# Patient Record
Sex: MALE | Race: BLACK OR AFRICAN AMERICAN | Employment: FULL TIME | URBAN - METROPOLITAN AREA
[De-identification: names, ages, dates, MRNs, and addresses within clinical notes are randomized per-mention and may not be internally consistent; named-entity substitution may affect disease eponyms.]

---

## 2017-12-11 ENCOUNTER — HOSPITAL ENCOUNTER (EMERGENCY)
Facility: HOSPITAL | Age: 22
Discharge: HOME | End: 2017-12-11
Attending: EMERGENCY MEDICINE

## 2017-12-11 VITALS
DIASTOLIC BLOOD PRESSURE: 80 MMHG | OXYGEN SATURATION: 99 % | SYSTOLIC BLOOD PRESSURE: 122 MMHG | TEMPERATURE: 98 F | WEIGHT: 170 LBS | HEIGHT: 73 IN | HEART RATE: 78 BPM | BODY MASS INDEX: 22.53 KG/M2 | RESPIRATION RATE: 18 BRPM

## 2017-12-11 DIAGNOSIS — K52.9 GASTROENTERITIS: Primary | ICD-10-CM

## 2017-12-11 PROCEDURE — 81003 URINALYSIS AUTO W/O SCOPE: CPT | Performed by: EMERGENCY MEDICINE

## 2017-12-11 PROCEDURE — 80048 BASIC METABOLIC PNL TOTAL CA: CPT | Performed by: EMERGENCY MEDICINE

## 2017-12-11 PROCEDURE — 99284 EMERGENCY DEPT VISIT MOD MDM: CPT

## 2017-12-11 PROCEDURE — 85025 COMPLETE CBC W/AUTO DIFF WBC: CPT | Performed by: EMERGENCY MEDICINE

## 2017-12-11 PROCEDURE — 96361 HYDRATE IV INFUSION ADD-ON: CPT

## 2017-12-11 PROCEDURE — 96374 THER/PROPH/DIAG INJ IV PUSH: CPT

## 2017-12-11 PROCEDURE — 81003 URINALYSIS AUTO W/O SCOPE: CPT

## 2017-12-11 RX ORDER — ONDANSETRON 4 MG/1
4 TABLET, ORALLY DISINTEGRATING ORAL EVERY 4 HOURS PRN
Qty: 10 TABLET | Refills: 0 | Status: SHIPPED | OUTPATIENT
Start: 2017-12-11 | End: 2017-12-18

## 2017-12-11 RX ORDER — ONDANSETRON 2 MG/ML
4 INJECTION INTRAMUSCULAR; INTRAVENOUS ONCE
Status: COMPLETED | OUTPATIENT
Start: 2017-12-11 | End: 2017-12-11

## 2017-12-11 NOTE — ED INITIAL ASSESSMENT (HPI)
Pt aox4. Pt presents to ed from home. Pt c/o weakness/fatigue started yesterday. Pt states vomited x 2 this am. Pt denies abd pain/fever.

## 2017-12-11 NOTE — ED PROVIDER NOTES
Patient Seen in: [facility] Emergency Department    History   Patient presents with:  Nausea/Vomiting/Diarrhea (gastrointestinal)    Stated Complaint: n/v/d    HPI    This is a 20-year-old male complaining of vomiting the patient states yesterday ju limits no focal deficits.     ED Course     Labs Reviewed   URINALYSIS WITH CULTURE REFLEX - Abnormal; Notable for the following:        Result Value    Ketones Urine Trace (*)     All other components within normal limits   BASIC METABOLIC PANEL (8) - Norm

## 2018-05-19 ENCOUNTER — HOSPITAL ENCOUNTER (EMERGENCY)
Facility: HOSPITAL | Age: 23
Discharge: HOME | End: 2018-05-19
Attending: EMERGENCY MEDICINE
Payer: COMMERCIAL

## 2018-05-19 VITALS
BODY MASS INDEX: 22.53 KG/M2 | OXYGEN SATURATION: 100 % | RESPIRATION RATE: 18 BRPM | WEIGHT: 170 LBS | HEART RATE: 72 BPM | TEMPERATURE: 98 F | DIASTOLIC BLOOD PRESSURE: 78 MMHG | SYSTOLIC BLOOD PRESSURE: 127 MMHG | HEIGHT: 73 IN

## 2018-05-19 DIAGNOSIS — S02.602A CLOSED FRACTURE OF LEFT SIDE OF MANDIBULAR BODY, INITIAL ENCOUNTER (HCC): Primary | ICD-10-CM

## 2018-05-19 PROCEDURE — 99283 EMERGENCY DEPT VISIT LOW MDM: CPT

## 2018-05-19 RX ORDER — TRAMADOL HYDROCHLORIDE 50 MG/1
TABLET ORAL EVERY 4 HOURS PRN
Qty: 10 TABLET | Refills: 0 | Status: ON HOLD | OUTPATIENT
Start: 2018-05-19 | End: 2018-05-23

## 2018-05-19 RX ORDER — IBUPROFEN 200 MG
200 TABLET ORAL EVERY 6 HOURS PRN
Status: ON HOLD | COMMUNITY
End: 2018-05-23
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
## 2018-05-19 NOTE — ED PROVIDER NOTES
Patient Seen in: [facility] Emergency Department    History   Patient presents with: Other    Stated Complaint: know jaw fracture sent to ED from dentist and orthodontist    HPI    47-year-old male presents for evaluation of a mandible fracture.   P Dentition intact. Mild swelling and tenderness along the left mandible just anterior to the angle   neck: Supple, no C-spine tenderness  Lungs: Clear to auscultation bilaterally. Heart: Regular rate and rhythm. Abdomen: Soft, nontender.    Skin: No rash

## 2018-05-23 ENCOUNTER — ANESTHESIA EVENT (OUTPATIENT)
Dept: SURGERY | Facility: HOSPITAL | Age: 23
End: 2018-05-23

## 2018-05-23 ENCOUNTER — ANESTHESIA (OUTPATIENT)
Dept: SURGERY | Facility: HOSPITAL | Age: 23
End: 2018-05-23

## 2018-05-23 ENCOUNTER — HOSPITAL ENCOUNTER (INPATIENT)
Facility: HOSPITAL | Age: 23
LOS: 1 days | Discharge: HOME | DRG: 132 | End: 2018-05-24
Attending: DENTIST | Admitting: DENTIST
Payer: COMMERCIAL

## 2018-05-23 ENCOUNTER — SURGERY (OUTPATIENT)
Age: 23
End: 2018-05-23

## 2018-05-23 ENCOUNTER — APPOINTMENT (OUTPATIENT)
Dept: GENERAL RADIOLOGY | Facility: HOSPITAL | Age: 23
DRG: 132 | End: 2018-05-23
Attending: DENTIST
Payer: COMMERCIAL

## 2018-05-23 PROBLEM — S02.600B: Status: ACTIVE | Noted: 2018-05-23

## 2018-05-23 PROCEDURE — S0077 INJECTION, CLINDAMYCIN PHOSP: HCPCS | Performed by: DENTIST

## 2018-05-23 PROCEDURE — 70110 X-RAY EXAM OF JAW 4/> VIEWS: CPT | Performed by: DENTIST

## 2018-05-23 PROCEDURE — 0NSV04Z REPOSITION LEFT MANDIBLE WITH INTERNAL FIXATION DEVICE, OPEN APPROACH: ICD-10-PCS | Performed by: DENTIST

## 2018-05-23 RX ORDER — HYDROMORPHONE HYDROCHLORIDE 1 MG/ML
0.4 INJECTION, SOLUTION INTRAMUSCULAR; INTRAVENOUS; SUBCUTANEOUS EVERY 5 MIN PRN
Status: DISCONTINUED | OUTPATIENT
Start: 2018-05-23 | End: 2018-05-24 | Stop reason: HOSPADM

## 2018-05-23 RX ORDER — LIDOCAINE HYDROCHLORIDE AND EPINEPHRINE 10; 10 MG/ML; UG/ML
INJECTION, SOLUTION INFILTRATION; PERINEURAL AS NEEDED
Status: DISCONTINUED | OUTPATIENT
Start: 2018-05-23 | End: 2018-05-23 | Stop reason: HOSPADM

## 2018-05-23 RX ORDER — ONDANSETRON 2 MG/ML
4 INJECTION INTRAMUSCULAR; INTRAVENOUS AS NEEDED
Status: DISCONTINUED | OUTPATIENT
Start: 2018-05-23 | End: 2018-05-24 | Stop reason: HOSPADM

## 2018-05-23 RX ORDER — SODIUM CHLORIDE, SODIUM LACTATE, POTASSIUM CHLORIDE, CALCIUM CHLORIDE 600; 310; 30; 20 MG/100ML; MG/100ML; MG/100ML; MG/100ML
INJECTION, SOLUTION INTRAVENOUS CONTINUOUS
Status: DISCONTINUED | OUTPATIENT
Start: 2018-05-23 | End: 2018-05-24

## 2018-05-23 RX ORDER — MEPERIDINE HYDROCHLORIDE 50 MG/ML
INJECTION INTRAMUSCULAR; INTRAVENOUS; SUBCUTANEOUS
Status: COMPLETED
Start: 2018-05-23 | End: 2018-05-23

## 2018-05-23 RX ORDER — NALOXONE HYDROCHLORIDE 0.4 MG/ML
80 INJECTION, SOLUTION INTRAMUSCULAR; INTRAVENOUS; SUBCUTANEOUS AS NEEDED
Status: DISCONTINUED | OUTPATIENT
Start: 2018-05-23 | End: 2018-05-24 | Stop reason: HOSPADM

## 2018-05-23 RX ORDER — METOCLOPRAMIDE HYDROCHLORIDE 5 MG/ML
10 INJECTION INTRAMUSCULAR; INTRAVENOUS EVERY 6 HOURS PRN
Status: DISCONTINUED | OUTPATIENT
Start: 2018-05-23 | End: 2018-05-24

## 2018-05-23 RX ORDER — MEPERIDINE HYDROCHLORIDE 25 MG/ML
12.5 INJECTION INTRAMUSCULAR; INTRAVENOUS; SUBCUTANEOUS AS NEEDED
Status: COMPLETED | OUTPATIENT
Start: 2018-05-23 | End: 2018-05-23

## 2018-05-23 RX ORDER — SODIUM CHLORIDE, SODIUM LACTATE, POTASSIUM CHLORIDE, CALCIUM CHLORIDE 600; 310; 30; 20 MG/100ML; MG/100ML; MG/100ML; MG/100ML
INJECTION, SOLUTION INTRAVENOUS CONTINUOUS
Status: DISCONTINUED | OUTPATIENT
Start: 2018-05-23 | End: 2018-05-24 | Stop reason: HOSPADM

## 2018-05-23 RX ORDER — CLINDAMYCIN PHOSPHATE 600 MG/50ML
600 INJECTION INTRAVENOUS ONCE
Status: COMPLETED | OUTPATIENT
Start: 2018-05-23 | End: 2018-05-23

## 2018-05-23 RX ORDER — HYDROCODONE BITARTRATE AND ACETAMINOPHEN 5; 325 MG/1; MG/1
2 TABLET ORAL AS NEEDED
Status: DISCONTINUED | OUTPATIENT
Start: 2018-05-23 | End: 2018-05-24 | Stop reason: HOSPADM

## 2018-05-23 RX ORDER — ONDANSETRON 2 MG/ML
4 INJECTION INTRAMUSCULAR; INTRAVENOUS EVERY 6 HOURS PRN
Status: DISCONTINUED | OUTPATIENT
Start: 2018-05-23 | End: 2018-05-24

## 2018-05-23 RX ORDER — HYDROCODONE BITARTRATE AND ACETAMINOPHEN 5; 325 MG/1; MG/1
1 TABLET ORAL AS NEEDED
Status: DISCONTINUED | OUTPATIENT
Start: 2018-05-23 | End: 2018-05-24 | Stop reason: HOSPADM

## 2018-05-23 RX ORDER — HYDROMORPHONE HYDROCHLORIDE 1 MG/ML
INJECTION, SOLUTION INTRAMUSCULAR; INTRAVENOUS; SUBCUTANEOUS
Status: COMPLETED
Start: 2018-05-23 | End: 2018-05-23

## 2018-05-23 RX ORDER — ACETAMINOPHEN 500 MG
1000 TABLET ORAL ONCE
Status: DISCONTINUED | OUTPATIENT
Start: 2018-05-23 | End: 2018-05-23 | Stop reason: HOSPADM

## 2018-05-23 NOTE — H&P
[address] Patient Status:  Hospital Outpatient Surgery    1995 MRN [id number]   [facility] SURGERY Attending Finesse Caceres 148 Day # 0 PCP Katie Capone MD     History of Present

## 2018-05-24 VITALS
HEART RATE: 45 BPM | DIASTOLIC BLOOD PRESSURE: 80 MMHG | TEMPERATURE: 99 F | SYSTOLIC BLOOD PRESSURE: 131 MMHG | WEIGHT: 158.31 LBS | BODY MASS INDEX: 20.98 KG/M2 | OXYGEN SATURATION: 98 % | RESPIRATION RATE: 18 BRPM | HEIGHT: 73 IN

## 2018-05-24 RX ORDER — DEXTROSE AND SODIUM CHLORIDE 5; .45 G/100ML; G/100ML
INJECTION, SOLUTION INTRAVENOUS CONTINUOUS
Status: DISCONTINUED | OUTPATIENT
Start: 2018-05-24 | End: 2018-05-24

## 2018-05-24 RX ORDER — HYDROMORPHONE HYDROCHLORIDE 1 MG/ML
0.5 INJECTION, SOLUTION INTRAMUSCULAR; INTRAVENOUS; SUBCUTANEOUS EVERY 2 HOUR PRN
Status: DISCONTINUED | OUTPATIENT
Start: 2018-05-24 | End: 2018-05-24

## 2018-05-24 RX ORDER — HYDROMORPHONE HYDROCHLORIDE 1 MG/ML
1 INJECTION, SOLUTION INTRAMUSCULAR; INTRAVENOUS; SUBCUTANEOUS EVERY 2 HOUR PRN
Status: DISCONTINUED | OUTPATIENT
Start: 2018-05-24 | End: 2018-05-24

## 2018-05-24 NOTE — PROGRESS NOTES
Pt resting in bed this am. vss. Pt a&ox3. Pt on ra with 02 sats wnl. Lungs cta. Pt denies difficulty breathing or sob. Jaw wired shut, no drainage noted. Pt using dilaudid for pain with good relief.  Reports he is tolerated water well overnight, denies diff

## 2018-05-24 NOTE — PROGRESS NOTES
NURSING DISCHARGE NOTE    Discharged Home via Wheelchair. Accompanied by Support staff  Belongings Taken by patient/family.    vss. Pt a&ox3. Pt on ra with 02 sats wnl. Lungs cta. Pt denies difficulty breathing or sob. Pt denies chest pain.  Denies n/v

## 2018-05-24 NOTE — PLAN OF CARE
PAIN - ADULT    • Verbalizes/displays adequate comfort level or patient's stated pain goal Progressing        Patient/Family Goals    • Patient/Family Long Term Goal Progressing    • Patient/Family Short Term Goal Progressing          Patient resting in be

## 2018-05-24 NOTE — PAYOR COMM NOTE
--------------  ADMISSION REVIEW     Payor: [address]  Subscriber #:  [id number]  Authorization Number: [id number]/A    Admit date: 5/24/18  Admit time: 0005       Admitting Physician: Sujatha Garza DDS  Attending Physician:  Sujatha Garza DDS  Pr Intravenous Mono Medina RN      lactated ringers infusion     Date Action Dose Route User    5/23/2018 2251 Started Other (none) Intravenous Jesse YOO RN      lidocaine-EPINEPHrine 1 %-1:[id number] injection     Date Action Dose Route Use

## 2018-05-24 NOTE — PROGRESS NOTES
Patient tolerating lortab well for pain. Denies pain at present. Denies n/v. Advanced to full liquids for lunch. Tolerated clear liquids well for breakfast. Will continue to monitor.

## 2018-05-24 NOTE — OPERATIVE REPORT
Operative Report    Patient: Yuliana Gonsalves    Surgeon: Dr. Frank Springer    Pre-operative Diagnosis: Left mandibular symphysis and subcondylar fractures    Post-operative Diagnosis: same    Anesthesia: General Anesthesia via nasal shanelle in left nares closed with 4-0 Monocryl internally and 4-0 vicyl for the mucosa. The oral cavity was irrigated and suctioned. The throat pack was removed. Hybrid arch bars were then secure with 8mm screws interproximally and 24g wires were used for MMF.   Bilateral sta

## 2018-05-24 NOTE — DIETARY NOTE
Nutrition Short Note    Dietitian consult received for modified diet. Advanced to fulls this afternoon. Reviewed and provided handout on blenderized diet and guidelines. Encouraged adequate nutrition and hydration.  Discussed rationale and usage of oral sup

## 2018-05-24 NOTE — PROGRESS NOTES
Pt tolerated full liquids well for lunch, had ensure shake without difficulty. Denies n/v. Using lortab for pain with good relief. Voiding with good output. Dietician saw patient this am and instructed on diet options.  Dr. Rosado Degree paged and notified and sta

## 2018-05-24 NOTE — ANESTHESIA PREPROCEDURE EVALUATION
PRE-OP EVALUATION    Patient Name: Manuela Dave    Pre-op Diagnosis: Mandible open fracture ([facility] [address] [D90.623D]    Procedure(s):  BILATERAL MANDIBLE FRACTURE REPAIR WITH POSSIBLE OTHER PROCEDURES AS NEEDED    Surgeon(s) and Role:     Catalina Perera DD guidelines. Post-procedure pain management plan discussed with surgeon and patient.     Comment: Plan for nasal intubation   Plan/risks discussed with: mother and patient                Present on Admission:  **None**

## 2018-05-24 NOTE — BRIEF OP NOTE
Pre-Operative Diagnosis: Mandible open fracture ([facility] [S02.609B]     Post-Operative Diagnosis: Mandible open fracture ([facility] [S02.609B]      Procedure Performed:   Procedure(s):  Open reduction internal fixation of left mandibular symphysis and condylar fra

## 2018-05-24 NOTE — ANESTHESIA POSTPROCEDURE EVALUATION
[address] [facility] Patient Status:  Hospital Outpatient Surgery   Age/Gender 23year old male MRN [id number]   Location [address] Attending Finesse Rodriguez 148 Day # 0 PCP MD Magnus Armando

## 2018-10-05 ENCOUNTER — OFFICE VISIT (OUTPATIENT)
Dept: FAMILY MEDICINE CLINIC | Facility: CLINIC | Age: 23
End: 2018-10-05
Payer: COMMERCIAL

## 2018-10-05 VITALS
TEMPERATURE: 100 F | BODY MASS INDEX: 22 KG/M2 | DIASTOLIC BLOOD PRESSURE: 74 MMHG | SYSTOLIC BLOOD PRESSURE: 118 MMHG | RESPIRATION RATE: 20 BRPM | OXYGEN SATURATION: 99 % | WEIGHT: 164.81 LBS | HEART RATE: 91 BPM

## 2018-10-05 DIAGNOSIS — J02.9 SORE THROAT: Primary | ICD-10-CM

## 2018-10-05 DIAGNOSIS — J02.0 STREP THROAT: ICD-10-CM

## 2018-10-05 PROCEDURE — 87880 STREP A ASSAY W/OPTIC: CPT | Performed by: NURSE PRACTITIONER

## 2018-10-05 PROCEDURE — 99213 OFFICE O/P EST LOW 20 MIN: CPT | Performed by: NURSE PRACTITIONER

## 2018-10-05 RX ORDER — PREDNISONE 20 MG/1
40 TABLET ORAL DAILY
Qty: 10 TABLET | Refills: 0 | Status: SHIPPED | OUTPATIENT
Start: 2018-10-05 | End: 2018-10-10

## 2018-10-05 RX ORDER — AMOXICILLIN 875 MG/1
875 TABLET, COATED ORAL 2 TIMES DAILY
Qty: 20 TABLET | Refills: 0 | Status: SHIPPED | OUTPATIENT
Start: 2018-10-05 | End: 2018-10-15

## 2018-10-05 NOTE — PROGRESS NOTES
CHIEF COMPLAINT:   Patient presents with:  Sore Throat: sweaty x3days       HPI:   Radha Ruiz is a 23year old male presents to clinic with symptoms of sore throat. Patient has had for 3 days. Symptoms have been worsening since onset.   Patient repor THROAT: oral mucosa pink, moist. Posterior pharynx erythematous and injected. No exudates. Tonsils 4/4. Breath is malodorous. No uvular deviation. No drooling. NECK: supple  LUNGS: clear to auscultation bilaterally, no wheezes or rhonchi.  Breathing is no The patient/parent indicates understanding of these issues and agrees to the plan. The patient is asked to follow up with their PCP prn. Patient Instructions     Pharyngitis: Strep (Confirmed)    You have had a positive test for strep throat.  Strep thr · Fever of 100.4ºF (38ºC) or higher, or as directed by your healthcare provider  · New or worsening ear pain, sinus pain, or headache  · Painful lumps in the back of neck  · Stiff neck  · Lymph nodes getting larger or becoming soft in the middle  · You can

## 2018-10-05 NOTE — PATIENT INSTRUCTIONS
Pharyngitis: Strep (Confirmed)    You have had a positive test for strep throat. Strep throat is a contagious illness. It is spread by coughing, kissing or by touching others after touching your mouth or nose.  Symptoms include throat pain that is worse w · Lymph nodes getting larger or becoming soft in the middle  · You can't swallow liquids or you can't open your mouth wide because of throat pain  · Signs of dehydration. These include very dark urine or no urine, sunken eyes, and dizziness.   · Trouble yusuf

## 2020-05-18 ENCOUNTER — TELEMEDICINE (OUTPATIENT)
Dept: INTERNAL MEDICINE CLINIC | Facility: CLINIC | Age: 25
End: 2020-05-18

## 2020-05-18 DIAGNOSIS — U07.1 ASYMPTOMATIC COVID-19 VIRUS INFECTION: Primary | ICD-10-CM

## 2020-05-18 PROCEDURE — 99213 OFFICE O/P EST LOW 20 MIN: CPT | Performed by: NURSE PRACTITIONER

## 2020-05-18 NOTE — PROGRESS NOTES
HPI:    Patient ID: Janell Dorantes is a 30year old male. Patient presents with:  Establish Care  Other: COVID-19 testing    This visit is conducted using Telemedicine with live, interactive video and audio. New to our office.  Patients father is a medications on file.      Allergies:No Known Allergies    Lab Results   Component Value Date    GLU 80 12/11/2017    BUN 13 12/11/2017    CREATSERUM 1.11 12/11/2017     12/11/2017    CA 9.9 12/11/2017     12/11/2017    K 3.8 12/11/2017    CL 10

## 2020-05-19 ENCOUNTER — PATIENT OUTREACH (OUTPATIENT)
Dept: CASE MANAGEMENT | Age: 25
End: 2020-05-19

## 2020-05-19 NOTE — PROGRESS NOTES
Home Monitoring Condition Update    Covid19+ test date: 5/16/20      Consent Verification:  Assessment Completed With: Patient  HIPAA Verified?   Yes    COVID-19 HOME MONITORING 5/19/2020   Temperature 98   Reading From Mouth   Pulse 52   Pulse taken from isolate away from other household members when possible and stay completely isolated from the general public 3 days after symptoms resolve or 10 days total (whichever is longer). Advised patient If symptoms worsen/ medical emergency to call 911.       Time

## 2020-05-20 ENCOUNTER — PATIENT OUTREACH (OUTPATIENT)
Dept: CASE MANAGEMENT | Age: 25
End: 2020-05-20

## 2020-05-20 NOTE — PROGRESS NOTES
Home Monitoring Condition Update    Covid19+ test date: 5/16/20      Consent Verification:  Assessment Completed With: Patient  HIPAA Verified?   Yes    COVID-19 HOME MONITORING 5/20/2020   Temperature 95.8   Reading From Mouth   Pulse 64   Pulse taken fr or 10 days total (whichever is longer). Advised patient If symptoms worsen/ medical emergency to call 911.       Time spent this encounter reviewing chart, speaking with patient, gathering resources  Total Time: 16  minutes

## 2020-05-21 ENCOUNTER — PATIENT OUTREACH (OUTPATIENT)
Dept: CASE MANAGEMENT | Age: 25
End: 2020-05-21

## 2020-05-21 NOTE — PROGRESS NOTES
Home Monitoring Condition Update    Covid19+ test date: 5/16/20      Consent Verification:  Assessment Completed With: Patient  HIPAA Verified?   Yes    COVID-19 HOME MONITORING 5/21/2020   Temperature 96   Reading From Mouth   Pulse 56   Pulse taken from call 911.       Time spent this encounter reviewing chart, speaking with patient, gathering resources  Total Time: 10  minutes

## 2020-05-22 ENCOUNTER — PATIENT OUTREACH (OUTPATIENT)
Dept: CASE MANAGEMENT | Age: 25
End: 2020-05-22

## 2020-05-22 NOTE — PROGRESS NOTES
Home Monitoring Condition Update    Covid19+ test date: 5/22/20      Consent Verification:  Assessment Completed With: Patient  HIPAA Verified?   Yes    COVID-19 HOME MONITORING 5/22/2020   Temperature 96.6   Reading From Mouth   Pulse 56   Pulse taken fr symptoms worsen/ medical emergency to call 911.       Time spent this encounter reviewing chart, speaking with patient, gathering resources  Total Time: 10  minutes

## 2020-05-26 ENCOUNTER — VIRTUAL PHONE E/M (OUTPATIENT)
Dept: INTERNAL MEDICINE CLINIC | Facility: CLINIC | Age: 25
End: 2020-05-26
Payer: COMMERCIAL

## 2020-05-26 DIAGNOSIS — U07.1 COVID-19 VIRUS INFECTION: Primary | ICD-10-CM

## 2020-05-26 PROCEDURE — 99213 OFFICE O/P EST LOW 20 MIN: CPT | Performed by: NURSE PRACTITIONER

## 2020-05-26 NOTE — PROGRESS NOTES
HPI:    Patient ID: Akash Pace is a 30year old male. Patient presents with: Other: +COVID-19    Patient consent to telephone visit today      Tested positive on May 16th. He has not developed any symptoms. He feels good.  He has been in self iso  12/11/2017    K 3.8 12/11/2017     12/11/2017    CO2 28.0 12/11/2017     Lab Results   Component Value Date    WBC 7.5 12/11/2017    RBC 5.20 12/11/2017    HGB 16.4 12/11/2017    HCT 47.2 12/11/2017    MCV 90.8 12/11/2017    MCH 31.5 12/11/201

## 2020-06-15 ENCOUNTER — TELEPHONE (OUTPATIENT)
Dept: INTERNAL MEDICINE CLINIC | Facility: CLINIC | Age: 25
End: 2020-06-15

## 2020-06-15 NOTE — TELEPHONE ENCOUNTER
Patient's mom. Walter Pool, called and asked if another COVID-19 test can be ordered? Please advise.

## 2020-06-16 NOTE — TELEPHONE ENCOUNTER
Spoke with mother informing her pt does not need to be retested and the reasoning. She expressed understanding.

## (undated) DEVICE — HEAD AND NECK CDS-LF: Brand: MEDLINE INDUSTRIES, INC.

## (undated) DEVICE — STANDARD HYPODERMIC NEEDLE,POLYPROPYLENE HUB: Brand: MONOJECT

## (undated) DEVICE — REM POLYHESIVE ADULT PATIENT RETURN ELECTRODE: Brand: VALLEYLAB

## (undated) DEVICE — MEDI-VAC YANKAUER SUCTION HANDLE W/BULBOUS TIP: Brand: CARDINAL HEALTH

## (undated) DEVICE — SYRINGE 10ML LL CONTRL SYRINGE

## (undated) DEVICE — SUTURE CHROMIC GUT 3-0 FS-2

## (undated) DEVICE — SOL  .9 1000ML BTL

## (undated) DEVICE — GLOVE ORTHO ALOETOUCH SZ 8-1/2

## (undated) DEVICE — DRAPE HALF 40X58 DYNJP2410

## (undated) DEVICE — KENDALL SCD EXPRESS SLEEVES, KNEE LENGTH, MEDIUM: Brand: KENDALL SCD

## (undated) DEVICE — SPONGE STICK WITH PVP-I: Brand: KENDALL

## (undated) NOTE — LETTER
[facility]  [address] [facility], [address] [place] [address]    Consent for Operation    Date: __________________    Time: _______________    1.  I authorize the performance upon Cordell Banerjee the following operation:    Procedure(s):  BILATERAL MANDIBLE procedure has been videotaped, the surgeon will obtain the original videotape. The hospital will not be responsible for storage or maintenance of this tape.     6. For the purpose of advancing medical education, I consent to the admittance of observers to t STATEMENTS REQUIRING INSERTION OR COMPLETION WERE FILLED IN.     Signature of Patient:   ___________________________    When the patient is a minor or mentally incompetent to give consent:  Signature of person authorized to consent for patient: ____________ drugs/illegal medications). Failure to inform my anesthesiologist about these medicines may increase my risk of anesthetic complications. · If I am allergic to anything or have had a reaction to anesthesia before.     3. I understand how the anesthesia med I have discussed the procedure and information above with the patient (or patient’s representative) and answered their questions. The patient or their representative has agreed to have anesthesia services.     _______________________________________________

## (undated) NOTE — ED AVS SNAPSHOT
Kb Samayoa   MRN: [id number]    Department:  [facility] Emergency Department   Date of Visit:  5/19/2018           Disclosure     Insurance plans vary and the physician(s) referred by the ER may not be covered by your plan.  Please contact y tell this physician (or your personal doctor if your instructions are to return to your personal doctor) about any new or lasting problems. The primary care or specialist physician will see patients referred from the [facility] Emergency Department.  Thais Gomez

## (undated) NOTE — ED AVS SNAPSHOT
Sudhakar Coles   MRN: [id number]    Department:  [facility] Emergency Department   Date of Visit:  12/11/2017           Disclosure     Insurance plans vary and the physician(s) referred by the ER may not be covered by your plan.  Please contact tell this physician (or your personal doctor if your instructions are to return to your personal doctor) about any new or lasting problems. The primary care or specialist physician will see patients referred from the [facility] Emergency Department.  Siddhartha Melgar

## (undated) NOTE — LETTER
[facility]  [address] [facility], [address] [place] [address]    Consent for Operation    Date: __________________    Time: _______________    1.  I authorize the performance upon Bridgette Kam the following operation:    Procedure(s):  BILATERAL MANDIBLE procedure has been videotaped, the surgeon will obtain the original videotape. The hospital will not be responsible for storage or maintenance of this tape.     6. For the purpose of advancing medical education, I consent to the admittance of observers to t STATEMENTS REQUIRING INSERTION OR COMPLETION WERE FILLED IN.     Signature of Patient:   ___________________________    When the patient is a minor or mentally incompetent to give consent:  Signature of person authorized to consent for patient: ____________ drugs/illegal medications). Failure to inform my anesthesiologist about these medicines may increase my risk of anesthetic complications. · If I am allergic to anything or have had a reaction to anesthesia before.     3. I understand how the anesthesia med I have discussed the procedure and information above with the patient (or patient’s representative) and answered their questions. The patient or their representative has agreed to have anesthesia services.     _______________________________________________

## (undated) NOTE — LETTER
[facility]  Yariellevar [address] [facility], [address] [place] [address]    Consent for Operation    Date: __________________    Time: _______________    1.  I authorize the performance upon Termayne A Harper the following operation:    Procedure(s):  BILATERAL MANDIBLE procedure has been videotaped, the surgeon will obtain the original videotape. The hospital will not be responsible for storage or maintenance of this tape.     6. For the purpose of advancing medical education, I consent to the admittance of observers to t STATEMENTS REQUIRING INSERTION OR COMPLETION WERE FILLED IN.     Signature of Patient:   ___________________________    When the patient is a minor or mentally incompetent to give consent:  Signature of person authorized to consent for patient: ____________ drugs/illegal medications). Failure to inform my anesthesiologist about these medicines may increase my risk of anesthetic complications. · If I am allergic to anything or have had a reaction to anesthesia before.     3. I understand how the anesthesia med I have discussed the procedure and information above with the patient (or patient’s representative) and answered their questions. The patient or their representative has agreed to have anesthesia services.     _______________________________________________